# Patient Record
Sex: MALE | Race: WHITE
[De-identification: names, ages, dates, MRNs, and addresses within clinical notes are randomized per-mention and may not be internally consistent; named-entity substitution may affect disease eponyms.]

---

## 2022-05-26 ENCOUNTER — HOSPITAL ENCOUNTER (EMERGENCY)
Dept: HOSPITAL 79 - ER1 | Age: 77
Discharge: HOME | End: 2022-05-26
Payer: MEDICARE

## 2022-05-26 DIAGNOSIS — F10.10: ICD-10-CM

## 2022-05-26 DIAGNOSIS — I10: ICD-10-CM

## 2022-05-26 DIAGNOSIS — F17.210: ICD-10-CM

## 2022-05-26 DIAGNOSIS — R53.83: Primary | ICD-10-CM
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
## 2022-05-26 DIAGNOSIS — Z85.828: ICD-10-CM

## 2022-05-26 LAB
BUN/CREATININE RATIO: 11 (ref 0–10)
HGB BLD-MCNC: 15.2 GM/DL (ref 14–17.5)
RED BLOOD COUNT: 4.64 M/UL (ref 4.2–5.5)
WHITE BLOOD COUNT: 3.7 K/UL (ref 4.5–11)

## 2022-05-26 PROCEDURE — G0480 DRUG TEST DEF 1-7 CLASSES: HCPCS

## 2022-07-19 ENCOUNTER — HOSPITAL ENCOUNTER (OUTPATIENT)
Dept: HOSPITAL 79 - ER1 | Age: 77
Setting detail: OBSERVATION
LOS: 2 days | Discharge: HOME | End: 2022-07-21
Attending: STUDENT IN AN ORGANIZED HEALTH CARE EDUCATION/TRAINING PROGRAM | Admitting: STUDENT IN AN ORGANIZED HEALTH CARE EDUCATION/TRAINING PROGRAM
Payer: MEDICARE

## 2022-07-19 VITALS — HEIGHT: 69 IN | WEIGHT: 114.25 LBS | BODY MASS INDEX: 16.92 KG/M2

## 2022-07-19 DIAGNOSIS — Z72.0: ICD-10-CM

## 2022-07-19 DIAGNOSIS — F10.11: ICD-10-CM

## 2022-07-19 DIAGNOSIS — Z79.82: ICD-10-CM

## 2022-07-19 DIAGNOSIS — I63.89: Primary | ICD-10-CM

## 2022-07-19 DIAGNOSIS — I10: ICD-10-CM

## 2022-07-19 DIAGNOSIS — G81.91: ICD-10-CM

## 2022-07-19 DIAGNOSIS — I16.0: ICD-10-CM

## 2022-07-19 DIAGNOSIS — Z85.828: ICD-10-CM

## 2022-07-19 LAB
BUN/CREATININE RATIO: 12 (ref 0–10)
HGB BLD-MCNC: 16.4 GM/DL (ref 14–17.5)
RED BLOOD COUNT: 4.87 M/UL (ref 4.2–5.5)
WHITE BLOOD COUNT: 3.9 K/UL (ref 4.5–11)

## 2022-07-19 PROCEDURE — G0378 HOSPITAL OBSERVATION PER HR: HCPCS

## 2022-07-20 LAB
BUN/CREATININE RATIO: 17 (ref 0–10)
HGB BLD-MCNC: 14.9 GM/DL (ref 14–17.5)
RED BLOOD COUNT: 4.63 M/UL (ref 4.2–5.5)
WHITE BLOOD COUNT: 3.3 K/UL (ref 4.5–11)

## 2022-07-21 LAB — BUN/CREATININE RATIO: 19 (ref 0–10)

## 2022-07-21 NOTE — NUR
REPORT CALLED TO [facility]. IN DEPTH  DISCHARGE EDUCATION
GIVEN TO SON AND FAMILY FRIEND ABOUT STROKE PREVENTION AND HOME
MEDICATIONS.AWAITING WHEELCHAIR FOR DISCHARGE.